# Patient Record
Sex: FEMALE | Race: WHITE | NOT HISPANIC OR LATINO | Employment: OTHER | URBAN - METROPOLITAN AREA
[De-identification: names, ages, dates, MRNs, and addresses within clinical notes are randomized per-mention and may not be internally consistent; named-entity substitution may affect disease eponyms.]

---

## 2018-05-16 ENCOUNTER — HOSPITAL ENCOUNTER (EMERGENCY)
Facility: HOSPITAL | Age: 68
Discharge: HOME | End: 2018-05-16
Attending: EMERGENCY MEDICINE | Admitting: EMERGENCY MEDICINE
Payer: COMMERCIAL

## 2018-05-16 VITALS
HEIGHT: 63 IN | WEIGHT: 163 LBS | HEART RATE: 69 BPM | TEMPERATURE: 99 F | BODY MASS INDEX: 28.88 KG/M2 | SYSTOLIC BLOOD PRESSURE: 159 MMHG | OXYGEN SATURATION: 97 % | DIASTOLIC BLOOD PRESSURE: 75 MMHG | RESPIRATION RATE: 18 BRPM

## 2018-05-16 DIAGNOSIS — R07.9 CHEST PAIN: ICD-10-CM

## 2018-05-16 DIAGNOSIS — K21.9 GASTROESOPHAGEAL REFLUX DISEASE, ESOPHAGITIS PRESENCE NOT SPECIFIED: Primary | ICD-10-CM

## 2018-05-16 LAB
ALBUMIN SERPL BCP-MCNC: 4.1 G/DL
ALP SERPL-CCNC: 68 U/L
ALT SERPL W/O P-5'-P-CCNC: 17 U/L
ANION GAP SERPL CALC-SCNC: 10 MMOL/L
AST SERPL-CCNC: 23 U/L
BASOPHILS # BLD AUTO: 0.04 K/UL
BASOPHILS NFR BLD: 0.5 %
BILIRUB SERPL-MCNC: 0.5 MG/DL
BUN SERPL-MCNC: 18 MG/DL
CALCIUM SERPL-MCNC: 10.5 MG/DL
CHLORIDE SERPL-SCNC: 93 MMOL/L
CO2 SERPL-SCNC: 30 MMOL/L
CREAT SERPL-MCNC: 0.9 MG/DL
DIFFERENTIAL METHOD: ABNORMAL
EOSINOPHIL # BLD AUTO: 0 K/UL
EOSINOPHIL NFR BLD: 0.5 %
ERYTHROCYTE [DISTWIDTH] IN BLOOD BY AUTOMATED COUNT: 11.8 %
EST. GFR  (AFRICAN AMERICAN): >60 ML/MIN/1.73 M^2
EST. GFR  (NON AFRICAN AMERICAN): >60 ML/MIN/1.73 M^2
GLUCOSE SERPL-MCNC: 122 MG/DL
HCT VFR BLD AUTO: 38.6 %
HGB BLD-MCNC: 13.6 G/DL
IMM GRANULOCYTES # BLD AUTO: 0.02 K/UL
IMM GRANULOCYTES NFR BLD AUTO: 0.3 %
LYMPHOCYTES # BLD AUTO: 1.3 K/UL
LYMPHOCYTES NFR BLD: 17.1 %
MCH RBC QN AUTO: 31.3 PG
MCHC RBC AUTO-ENTMCNC: 35.2 G/DL
MCV RBC AUTO: 89 FL
MONOCYTES # BLD AUTO: 0.5 K/UL
MONOCYTES NFR BLD: 6.7 %
NEUTROPHILS # BLD AUTO: 5.5 K/UL
NEUTROPHILS NFR BLD: 74.9 %
NRBC BLD-RTO: 0 /100 WBC
PLATELET # BLD AUTO: 259 K/UL
PMV BLD AUTO: 10.6 FL
POTASSIUM SERPL-SCNC: 3.6 MMOL/L
PROT SERPL-MCNC: 7.6 G/DL
RBC # BLD AUTO: 4.35 M/UL
SODIUM SERPL-SCNC: 133 MMOL/L
TROPONIN I SERPL DL<=0.01 NG/ML-MCNC: <0.006 NG/ML
WBC # BLD AUTO: 7.3 K/UL

## 2018-05-16 PROCEDURE — 85025 COMPLETE CBC W/AUTO DIFF WBC: CPT

## 2018-05-16 PROCEDURE — 93005 ELECTROCARDIOGRAM TRACING: CPT

## 2018-05-16 PROCEDURE — 93010 ELECTROCARDIOGRAM REPORT: CPT | Mod: ,,, | Performed by: INTERNAL MEDICINE

## 2018-05-16 PROCEDURE — 25000003 PHARM REV CODE 250: Performed by: EMERGENCY MEDICINE

## 2018-05-16 PROCEDURE — 99284 EMERGENCY DEPT VISIT MOD MDM: CPT | Mod: 25

## 2018-05-16 PROCEDURE — 84484 ASSAY OF TROPONIN QUANT: CPT

## 2018-05-16 PROCEDURE — 80053 COMPREHEN METABOLIC PANEL: CPT

## 2018-05-16 PROCEDURE — 99283 EMERGENCY DEPT VISIT LOW MDM: CPT | Mod: ,,, | Performed by: EMERGENCY MEDICINE

## 2018-05-16 RX ORDER — NAPROXEN SODIUM 220 MG/1
81 TABLET, FILM COATED ORAL DAILY
COMMUNITY

## 2018-05-16 RX ORDER — POTASSIUM CHLORIDE 1.5 G/1.58G
20 POWDER, FOR SOLUTION ORAL ONCE
COMMUNITY

## 2018-05-16 RX ORDER — PRAVASTATIN SODIUM 20 MG/1
20 TABLET ORAL DAILY
COMMUNITY

## 2018-05-16 RX ORDER — RAMIPRIL 10 MG/1
10 CAPSULE ORAL DAILY
COMMUNITY

## 2018-05-16 RX ORDER — TRIAMTERENE AND HYDROCHLOROTHIAZIDE 37.5; 25 MG/1; MG/1
2 CAPSULE ORAL EVERY MORNING
COMMUNITY

## 2018-05-16 RX ORDER — MELOXICAM 15 MG/1
15 TABLET ORAL DAILY
COMMUNITY

## 2018-05-16 RX ORDER — PANTOPRAZOLE SODIUM 40 MG/1
40 TABLET, DELAYED RELEASE ORAL 2 TIMES DAILY
COMMUNITY

## 2018-05-16 RX ADMIN — ALUMINUM HYDROXIDE, MAGNESIUM HYDROXIDE, AND SIMETHICONE 50 ML: 200; 200; 20 SUSPENSION ORAL at 11:05

## 2018-05-16 NOTE — ED PROVIDER NOTES
SCRIBE #1 NOTE: I, Adele Sorensen, am scribing for, and in the presence of, Dr. Ross.        CC: Chest Pain (sunday brief exp to chlorox fumes, chest discomfort, hx gerd)      HPI: Monse Hammond is a 68 y.o. year old female who presents to the ED complaining of chest pain x 3 days.   Pt states that on Sunday, she breathed in some bleach fumes, as it was sitting in her kitchen sink.  She states she has had a persistent discomfort, pain and burning in her throat and chest, with radiation to her upper back since breathing in the bleach. She states it feels similar to past episodes of GERD. Additionally endorses mild cough. This discomfort is burning, intermittent, worse after she eats. Denies skin contact with bleach. No shortness of breath. PMHx includes GERD, HLD, HTN and arthritis.        Past Medical History:   Diagnosis Date    Arthritis     GERD (gastroesophageal reflux disease)     Hyperlipemia     Hypertension      History reviewed. No pertinent surgical history.  Family History   Problem Relation Age of Onset    No Known Problems Mother     No Known Problems Father      No current facility-administered medications on file prior to encounter.      No current outpatient prescriptions on file prior to encounter.     Cephalosporins  Social History     Social History    Marital status:      Spouse name: N/A    Number of children: N/A    Years of education: N/A     Social History Main Topics    Smoking status: Former Smoker    Smokeless tobacco: Never Used    Alcohol use No    Drug use: No    Sexual activity: Yes     Partners: Male     Other Topics Concern    None     Social History Narrative    None       ROS:     Constitutional : neg  HEENT Positive for throat pain (burning)  Resp neg  Cardiac  Positive for chest pain (burning) with radiation to back   GI neg   neg  Neuro neg  Heme/Immune: neg  Endo neg  Skin neg    PHYSICAL EXAM:  Vitals:    05/16/18 1213   BP: (!) 159/75   Pulse: 69    Resp: 18   Temp: 98.8 °F (37.1 °C)         PHYSICAL EXAM:   general: comfortable, in no acute distress  VS: triage VS reviewed  HEENT: NC/AT, PERRL, EOMI post oropharynx symmetrical no erythema, no stridor  Neck: trachea midline  CV: RRR, no m/r/g, no LE pitting edema  Resp: CTAB  ABD:  ND, + normal BS, NT  Renal: No CVAT  Neuro: AAO x 3,  face symmetric, speech normal  Ext: no deformity, +2 symmetrical peripheral pulses          DATA & INTERVENTIONS:    LABS reviewed:  Labs Reviewed   CBC W/ AUTO DIFFERENTIAL - Abnormal; Notable for the following:        Result Value    MCH 31.3 (*)     Gran% 74.9 (*)     Lymph% 17.1 (*)     All other components within normal limits   COMPREHENSIVE METABOLIC PANEL - Abnormal; Notable for the following:     Sodium 133 (*)     Chloride 93 (*)     CO2 30 (*)     Glucose 122 (*)     All other components within normal limits   TROPONIN I       RADIOLOGY reviewed:  Imaging Results          X-Ray Chest PA And Lateral (Final result)  Result time 05/16/18 12:02:30    Final result by Jermaine Dave MD (05/16/18 12:02:30)                 Impression:      1. No acute cardiopulmonary process.      Electronically signed by: Jermaine Dave MD  Date:    05/16/2018  Time:    12:02             Narrative:    EXAMINATION:  XR CHEST PA AND LATERAL    CLINICAL HISTORY:  Chest Pain;    TECHNIQUE:  PA and lateral views of the chest were performed.    COMPARISON:  None    FINDINGS:  The cardiomediastinal silhouette is not enlarged, noting calcification of the aortic arch..  There is no pleural effusion.  The trachea is midline.  The lungs are symmetrically expanded bilaterally without evidence of acute parenchymal process. No large focal consolidation seen.  There is no pneumothorax.  The osseous structures are remarkable for degenerative changes noting levo scoliotic curvature of the lower thoracic spine/lumbar junction..                                MEDICATIONS/FLUIDS:  Medications   GI cocktail  (mylanta 30 mL, lidocaine 2 % viscous 10 mL, dicyclomine 10 mL) 50 mL (50 mLs Oral Given 5/16/18 1150)         MDM:   69 yo F w/ symptoms of throat discomfort and burning chest pain similar to her GERD but worse after she inhaled chlorine vapors 3 days ago.  Patient in no respiratory discomfort, lungs clear to auscultation, cxr no infiltrates, no stridor  Trop neg  likely GERD  Pain better w/ GI cocktail  Discussed to increase her protonix to 20 mg bid from once daily for the next 14 days        IMPRESSION:  1.) GERD      Dispo: Discharge    Critical Care Time: N/A      Scribe Attestation:   Scribe #1: I performed the above scribed service and the documentation accurately describes the services I performed. I attest to the accuracy of the note.          Alis Ross MD  05/16/18 [id number]

## 2018-05-16 NOTE — PROVIDER PROGRESS NOTES - EMERGENCY DEPT.
Encounter Date: 5/16/2018  SCRIBE NOTE: Allegra SINHA am scribing for, and in the presence of, Dr. Aguirre.    ED Physician Progress Notes        Physician Note:   Triage EKG    EKG - STEMI Decision  Initial Reading: No STEMI present (NSR. Nonspecific ST changes. Heart Rate of 82 bpm).

## 2018-05-16 NOTE — ED TRIAGE NOTES
Presents to ER with complaint of upper chest and upper back burning.  States that she has experienced this in the past and was DX with reflux.  Patient's name and date of birth checked and is correct.  LOC: The patient is awake, alert and aware of environment with an appropriate affect, the patient is oriented x 3 and speaking appropriately.  APPEARANCE: Patient resting comfortably and in no acute distress, patient is clean and well groomed, patient's clothing is properly fastened.  CARDIOVASCULAR:  Heart rate regular and even with no peripheral edema noted.  SKIN: The skin is warm and dry, patient has normal skin turgor and moist mucus membranes, skin intact, no breakdown or brusing noted. MUSKULOSKELETAL: Patient moving all extremities well, no obvious swelling or deformities noted.  RESPIRATORY: Airway is open and patent, respirations are spontaneous, patient has a normal effort and rate.